# Patient Record
Sex: FEMALE
[De-identification: names, ages, dates, MRNs, and addresses within clinical notes are randomized per-mention and may not be internally consistent; named-entity substitution may affect disease eponyms.]

---

## 2024-05-01 ENCOUNTER — APPOINTMENT (OUTPATIENT)
Dept: OBGYN | Facility: CLINIC | Age: 34
End: 2024-05-01

## 2024-05-01 PROBLEM — Z00.00 ENCOUNTER FOR PREVENTIVE HEALTH EXAMINATION: Status: ACTIVE | Noted: 2024-05-01

## 2024-05-14 ENCOUNTER — NON-APPOINTMENT (OUTPATIENT)
Age: 34
End: 2024-05-14

## 2024-05-15 ENCOUNTER — APPOINTMENT (OUTPATIENT)
Dept: OBGYN | Facility: CLINIC | Age: 34
End: 2024-05-15
Payer: COMMERCIAL

## 2024-05-15 ENCOUNTER — NON-APPOINTMENT (OUTPATIENT)
Age: 34
End: 2024-05-15

## 2024-05-15 VITALS — SYSTOLIC BLOOD PRESSURE: 118 MMHG | HEART RATE: 80 BPM | DIASTOLIC BLOOD PRESSURE: 75 MMHG | OXYGEN SATURATION: 98 %

## 2024-05-15 DIAGNOSIS — N64.4 MASTODYNIA: ICD-10-CM

## 2024-05-15 PROCEDURE — 99203 OFFICE O/P NEW LOW 30 MIN: CPT

## 2024-05-15 NOTE — HISTORY OF PRESENT ILLNESS
[FreeTextEntry1] : ,[facility] 34yo Female G0 who presents for breast exam.  Presents to the office for Breast Exam Pt reports she felt a bump but this was prior to her period, it resolved after period finished Pt reports she does not feel anything on the breasts anymore but would still like to have a breast Exam Done Pt works in [place], originally from [facility], lives in [facility] [place].

## 2024-05-15 NOTE — PLAN
[FreeTextEntry1] : DAYANA MarcosZULMA 32yo Female G0 who presents for breast exam.  - Normal breast exam - Discussed egg freezing - Answered questions about birth control  RTO for [employer] or PRN.  Linda Burr MD